# Patient Record
(demographics unavailable — no encounter records)

---

## 2024-10-24 NOTE — HISTORY OF PRESENT ILLNESS
[de-identified] : Patients presents Post Operative Visit of Left Hip Replacement. Surgery was on September 9, 2024, patients six weeks status post. [de-identified] : 74-year-old male presenting now about six weeks status-post total hip arthroplasty. He reports that his hip has been improving in terms of strength and range of motion. He feels he's able to walk and dance without any issues at this time. Ambulated without use any assistive devices. Not taking any medications for pain at this point. He's not had outpatient physical therapy as of yet and is planning to start within the next two days. Overall doing well this time. [de-identified] : Gait: Normal and non-antalgic with no assistive device. He does demonstrate a slightly forward leaning posture from the lumbopelvic junction.   Limb lengths: clinically equal  Left hip: - Focal soft tissue swelling: none - Ecchymosis: none - Erythema: none - Wounds: well-healed anterior incision, benign-appearing - Tenderness: none - ROM:    - Flexion: 105   - Extension: 0   - Adduction: 15   - Abduction: 45   - Internal rotation in 90 degrees of hip flexion: 10   - External rotation in 90 degrees of hip flexion: 45 - SIMON: negative - FADIR: mildly uncomfortable - Roman: negative - Stinchfield: negative - Flexor power: 5/5 - Abductor power: 4+/5 [de-identified] : L hip XRs were taken today.  Location of imaging: [facility] Date of exam: 10/22/2024  Right hip -- Arthritis: moderate Deformity: none Osteonecrosis: none  Left hip -- - stable appearance of left total hip replacement as compared to prior imaging without evidence of mechanical complication. [de-identified] : 74 year old male now about six weeks status post left total hip replacement.  [de-identified] : - I encouraged him to begin outpatient therapy as soon as possible and I provided him with a new referral to do so, as well as a hip focused home exercise program.  - Regarding his nocturnal symptoms, they're consistent with postoperative inflammatory pain and likely trochanteric bursitis, for which I recommended that he return to taking a PM dose of Celebrex and we will also write him for gabapentin to be used QHS as needed. - He'll follow up next in two months with repeat left leg x-rays.

## 2024-10-24 NOTE — HISTORY OF PRESENT ILLNESS
[de-identified] : Patients presents Post Operative Visit of Left Hip Replacement. Surgery was on September 9, 2024, patients six weeks status post. [de-identified] : 74-year-old male presenting now about six weeks status-post total hip arthroplasty. He reports that his hip has been improving in terms of strength and range of motion. He feels he's able to walk and dance without any issues at this time. Ambulated without use any assistive devices. Not taking any medications for pain at this point. He's not had outpatient physical therapy as of yet and is planning to start within the next two days. Overall doing well this time. [de-identified] : Gait: Normal and non-antalgic with no assistive device. He does demonstrate a slightly forward leaning posture from the lumbopelvic junction.   Limb lengths: clinically equal  Left hip: - Focal soft tissue swelling: none - Ecchymosis: none - Erythema: none - Wounds: well-healed anterior incision, benign-appearing - Tenderness: none - ROM:    - Flexion: 105   - Extension: 0   - Adduction: 15   - Abduction: 45   - Internal rotation in 90 degrees of hip flexion: 10   - External rotation in 90 degrees of hip flexion: 45 - SIMON: negative - FADIR: mildly uncomfortable - Roman: negative - Stinchfield: negative - Flexor power: 5/5 - Abductor power: 4+/5 [de-identified] : L hip XRs were taken today.  Location of imaging: [facility] Date of exam: 10/22/2024  Right hip -- Arthritis: moderate Deformity: none Osteonecrosis: none  Left hip -- - stable appearance of left total hip replacement as compared to prior imaging without evidence of mechanical complication. [de-identified] : 74 year old male now about six weeks status post left total hip replacement.  [de-identified] : - I encouraged him to begin outpatient therapy as soon as possible and I provided him with a new referral to do so, as well as a hip focused home exercise program.  - Regarding his nocturnal symptoms, they're consistent with postoperative inflammatory pain and likely trochanteric bursitis, for which I recommended that he return to taking a PM dose of Celebrex and we will also write him for gabapentin to be used QHS as needed. - He'll follow up next in two months with repeat left leg x-rays.

## 2024-10-24 NOTE — END OF VISIT
[FreeTextEntry3] :   Documented by Kayli Curry acting as a scribe for Dr. Clint Johnson. 10/22/2024   All medical record entries made by the Kayli Curry (Scribe) were at my, Dr. Clint Johnson, direction and personally dictated by me on 10/22/2024. I have reviewed the chart and agree that the record accurately reflects my personal performance of the history, physical exam, assessment and plan. I have also personally directed, reviewed, and agreed with the chart.

## 2024-10-24 NOTE — END OF VISIT
[FreeTextEntry3] :   Documented by Kayli Curry acting as a scribe for Dr. Clint Johnson. 10/22/2024   All medical record entries made by the Kayli Curry (Scribe) were at my, Dr. Clitn Johnson, direction and personally dictated by me on 10/22/2024. I have reviewed the chart and agree that the record accurately reflects my personal performance of the history, physical exam, assessment and plan. I have also personally directed, reviewed, and agreed with the chart.

## 2025-01-14 NOTE — PHYSICAL EXAM
[de-identified] : General appearance: well nourished and hydrated, pleasant, alert and oriented x 3, cooperative.   HEENT: normocephalic, EOM intact, wearing mask, external auditory canal clear.   Cardiovascular: no lower leg edema, no varicosities, dorsalis pedis pulses palpable and symmetric.   Lymphatics: no palpable lymphadenopathy, no lymphedema.   Neurologic: sensation is normal, no muscle weakness in upper or lower extremities, patella tendon reflexes present and symmetric.   Dermatologic: skin moist, warm, no rash.   Spine: cervical spine with normal lordosis and painless range of motion, thoracic spine with normal kyphosis and painless range of motion, lumbosacral spine with normal lordosis and painless range of motion.  No tenderness to palpation along midline spine and paraspinal musculature.  Sacroiliac joints nontender bilaterally. Negative SLR and crossed SLR tests bilaterally. Gait: He presents today with an assistive device. He transitions easily from seated to standing and demonstrates a stable non antalgic gait pattern with good speed and coordination. No limp.  Limb lengths: clinically equal  Left hip: - Focal soft tissue swelling: none - Ecchymosis: none - Erythema: none - Wounds: well healed anterior incision, benign appearing. - Tenderness: non tender to palpation. - ROM:    - Flexion: 105   - Extension: 0   - Adduction: 15   - Abduction: 45   - Internal rotation in 90 degrees of hip flexion: 10   - External rotation in 90 degrees of hip flexion: 55 - SIMON: negative - FADIR: negative - Roman: negative - Stinchfield: negative - Flexor power: 5/5 - Abductor power: 5/5   [de-identified] : AP pelvis and left hip x-rays were repeated today. These demonstrate stable appearance of his left total hip replacement as compared to prior imaging without evidence of mechanical complication. Right hip continues to demonstrate moderate osteoarthritis, TONNIS 2, without associated deformity or osteonecrosis.

## 2025-01-14 NOTE — END OF VISIT
[FreeTextEntry3] : Documented by Linda García acting as a scribe for Dr. Clint Johnson. 01/14/2025  All medical record entries made by the Scribe were at my, Dr. Clint Johnson, direction and personally dictated by me on 01/14/2025. I have reviewed the chart and agree that the record accurately reflects my personal performance of the history, physical exam, assessment and plan. I have also personally directed, reviewed, and agreed with the chart.

## 2025-01-14 NOTE — HISTORY OF PRESENT ILLNESS
[de-identified] : L SEGUNDO 9/9/24 01/14/2025: 74-year-old male following up on his third post-operative visit following left total hip replacement performed on September 9th, 2024. He reports that  he's doing very well with respect to his left hip and is satisfied with the function overall of it. He has minimal to no residual left hip pain and feels that he does not require any pain  medications. He actually reports that the right hip is more bothersome to him than the left hip at this point. He has no nocturnal problems and sleeps well at night.  He ambulates without the use of an assistive device and ambulation is his main form of exercise. He reports that he's been dealing with an upper respiratory  infection for the past several weeks and was recently just discontinued from antibiotics. He feels that he's become deconditioned as a result of this and would like  to increase his physical activity level.  06/28/2024: 74 year old male referred by Dr. Del Rio for consideration of left total hip arthroplasty. He has been having left hip pain for 6 years now, localized to the lateral hip with radiation to the knee. He reports the pain is worse with walking and bearing weight. He reports a walking distance limitation of 4-5 blocks when pushing through, but it is normally less than a block. He rates his pain at a 6/10. He was taking naproxen as needed for pain, but he reports that it did not provide much benefit. He has not participated in PT or had any injections. He reports he is taking arthritis supplements.  PMH of DM (A1c of 5.5 on 04/02/2024), HTN, and questionable Parkinson's Disease Surgical history of left Cubital Tunnel Release on 06/24/2024 by Dr. Grubbs. Pt reports an allergy to trimethoprim (rash), but he is quite sure he does not have a sulfa allergy. Pt is a former physician, current psychotherapist.       SOFÍA MARROQUINTRACYTAN presents with pain of the left hip. Pain levels include a current pain level of 6/10.      Modifying factors - worsened by walking. Relieving factors include relieved by acetaminophen, relieved by nonsteroidal anti-inflammatory drugs and relieved by rest.

## 2025-01-14 NOTE — DISCUSSION/SUMMARY
[de-identified] : IMP: 74-year-old male, now about four months status post left total hip replacement, overall doing well at this time with mildly symptomatic right hip osteoarthritis. - He's doing very well overall.  - In order to address his concern about deconditioning, I provided him with another referral for physical therapy and a home exercise program.  - He'll follow up next in September of 2025 with repeat bilateral hip x-rays or earlier as needed.